# Patient Record
Sex: MALE | Race: ASIAN | NOT HISPANIC OR LATINO | URBAN - METROPOLITAN AREA
[De-identification: names, ages, dates, MRNs, and addresses within clinical notes are randomized per-mention and may not be internally consistent; named-entity substitution may affect disease eponyms.]

---

## 2024-05-23 NOTE — ASU PATIENT PROFILE, ADULT - NSICDXPASTMEDICALHX_GEN_ALL_CORE_FT
PAST MEDICAL HISTORY:  Hyperlipidemia      PAST MEDICAL HISTORY:  BPH (benign prostatic hyperplasia)     HTN (hypertension)     Hyperlipidemia

## 2024-05-23 NOTE — ASU PATIENT PROFILE, ADULT - NS PREOP UNDERSTANDS INFO
verify escort, states he doesn't need one. was told he does need one.  Left message for the surgeon. Assess DOS/yes verify escort, states he doesn't need one. was told he does need one.  Left message for the surgeon. Dr. Gibson Called back. Stated he will call patient , He stated the patient was advised preop that he needs and escort. Stated no escort, no surgery. [id number]. Assess DOS/yes

## 2024-05-24 ENCOUNTER — OUTPATIENT (OUTPATIENT)
Dept: OUTPATIENT SERVICES | Facility: HOSPITAL | Age: 74
LOS: 1 days | Discharge: HOME | End: 2024-05-24
Payer: MEDICARE

## 2024-05-24 VITALS
OXYGEN SATURATION: 100 % | RESPIRATION RATE: 16 BRPM | SYSTOLIC BLOOD PRESSURE: 158 MMHG | TEMPERATURE: 98 F | HEIGHT: 66.93 IN | WEIGHT: 136.69 LBS | DIASTOLIC BLOOD PRESSURE: 90 MMHG | HEART RATE: 62 BPM

## 2024-05-24 VITALS
RESPIRATION RATE: 14 BRPM | OXYGEN SATURATION: 100 % | SYSTOLIC BLOOD PRESSURE: 152 MMHG | HEART RATE: 69 BPM | DIASTOLIC BLOOD PRESSURE: 87 MMHG

## 2024-05-24 DIAGNOSIS — Z98.890 OTHER SPECIFIED POSTPROCEDURAL STATES: Chronic | ICD-10-CM

## 2024-05-24 PROCEDURE — 88173 CYTOPATH EVAL FNA REPORT: CPT | Mod: 26

## 2024-05-24 PROCEDURE — 88305 TISSUE EXAM BY PATHOLOGIST: CPT | Mod: 26

## 2024-05-24 RX ORDER — SODIUM CHLORIDE 9 MG/ML
1000 INJECTION, SOLUTION INTRAVENOUS
Refills: 0 | Status: DISCONTINUED | OUTPATIENT
Start: 2024-05-24 | End: 2024-05-24

## 2024-05-24 RX ORDER — ACETAMINOPHEN 500 MG
1000 TABLET ORAL ONCE
Refills: 0 | Status: COMPLETED | OUTPATIENT
Start: 2024-05-24 | End: 2024-05-24

## 2024-05-24 RX ORDER — APREPITANT 80 MG/1
40 CAPSULE ORAL ONCE
Refills: 0 | Status: COMPLETED | OUTPATIENT
Start: 2024-05-24 | End: 2024-05-24

## 2024-05-24 RX ORDER — FENTANYL CITRATE 50 UG/ML
25 INJECTION INTRAVENOUS
Refills: 0 | Status: DISCONTINUED | OUTPATIENT
Start: 2024-05-24 | End: 2024-05-24

## 2024-05-24 RX ORDER — CHLORHEXIDINE GLUCONATE 213 G/1000ML
1 SOLUTION TOPICAL ONCE
Refills: 0 | Status: COMPLETED | OUTPATIENT
Start: 2024-05-24 | End: 2024-05-24

## 2024-05-24 RX ADMIN — APREPITANT 40 MILLIGRAM(S): 80 CAPSULE ORAL at 13:33

## 2024-05-24 RX ADMIN — CHLORHEXIDINE GLUCONATE 1 APPLICATION(S): 213 SOLUTION TOPICAL at 13:36

## 2024-05-24 RX ADMIN — Medication 1000 MILLIGRAM(S): at 13:34

## 2024-05-24 NOTE — ASU DISCHARGE PLAN (ADULT/PEDIATRIC) - CARE PROVIDER_API CALL
Anthony Gibson  Surgery  [address] [place] [address], [address]  [place] [address]  Phone: [phone]  Fax: [phone]  Follow Up Time: 1 week

## 2024-05-24 NOTE — BRIEF OPERATIVE NOTE - OPERATION/FINDINGS
Left groin was prepped and draped in sterile fashion. ASIS and pubic tubercle were identified and marked. Local anesthetic was infiltrated along the inguinal ligament. Oblique linear incision was made medially over inguinal ligament. Dissection was performed down to external oblique aponeurosis. Aponeurosis was opened up along the muscle fibers and extended inferolaterally and superomedially down to external ring and up to internal ring, respectively. Ilioinguinal nerve was identified anterior to spermatic cord. Spermatic cord was bluntly dissected around and isolated using a Penrose drain. Cord was then dissected down to external ring but no hernia sac was identified or appreciated. A large left scrotal cyst was identified instead. Cyst was drained and sent for cytology. The external oblique aponeurosis was reapproximated with Vicryl running suture. Local anesthetic was used to infiltrate inguinal floor and subcutaneous issue again. Subcutaneous, deep dermal and skin closure was subsequently performed with Vicryl and Monocryl sutures. Incision was dressed with Steri strips, 4 x 4 cm gauze and Tegaderm. Patient was taken to recovery room in stable condition.

## 2024-05-24 NOTE — PRE-ANESTHESIA EVALUATION ADULT - NSANTHOSAYNRD_GEN_A_CORE
No. JOAQUINA screening performed.  STOP BANG Legend: 0-2 = LOW Risk; 3-4 = INTERMEDIATE Risk; 5-8 = HIGH Risk

## 2024-05-24 NOTE — ASU DISCHARGE PLAN (ADULT/PEDIATRIC) - ASU DC SPECIAL INSTRUCTIONSFT
- You may alternate between over-the-counter Tylenol 650 mg and Ibuprofen 600 mg every 3 hours for the next 48 hours. Afterwards take only Tylenol as needed. Do not take Ibuprofen continuously for more than 5 days and do not take on empty stomach.   - Take Opioid as prescribed only for severe or breakthrough pain. This medication may make you constipated so ensure to take a stool softener (Colace) with it.     Incision Care:   - Your incision was closed/dressed with steri-strips, 4 x 4 cm gauze and Tegaderm. The Tegaderm is waterproof so you may take a shower as early as the day of surgery, but avoid scrubbing your incisions and just allow water to run over them.    - Take off the Tegaderm and 4 x 4 cm gauze after 48 hours but leave the steri-strips on, they will fall off on their own or will be taken off at your first post-op visit.   - Do not submerge in water for at least 2 weeks. This includes pools, bath tubs and hot tubs.     Activity/Excercise:   - Avoid strenuous exercise for the first week after Surgery, and avoid heavy lifting (> 15 lbs) for at least 6 weeks.   - You may begin light aerobic exercises as tolerated or preferred after the first few days post-op as long as your pain is well controlled   - Avoid driving in the first 24 hours post-op and while taking Narcotic/Opioid pain medications     Diet:   - Continue your regular diet; no restrictions   - Increase your fluid intake for the first week post-op and while taking Narcotic/Opioid pain medications     Follow up:   - Please call your Surgeon's office ASAP at [phone] to schedule or confirm your follow up appointment which should be 7 to 10 days after your surgery Pain Control:   - You may alternate between over-the-counter Tylenol 650 mg and Ibuprofen 600 mg every 3 hours for the next 48 hours. Afterwards take only Tylenol as needed. Do not take Ibuprofen continuously for more than 5 days and do not take on empty stomach.   - Take Opioid as prescribed only for severe or breakthrough pain. This medication may make you constipated so ensure to take a stool softener (Colace) with it.     Incision Care:   - Your incision was closed/dressed with steri-strips, 4 x 4 cm gauze and Tegaderm. The Tegaderm is waterproof so you may take a shower as early as the day of surgery, but avoid scrubbing your incisions and just allow water to run over them.    - Take off the Tegaderm and 4 x 4 cm gauze after 48 hours but leave the steri-strips on, they will fall off on their own or will be taken off at your first post-op visit.   - Do not submerge in water for at least 2 weeks. This includes pools, bath tubs and hot tubs.     Activity/Excercise:   - Avoid strenuous exercise for the first week after Surgery, and avoid heavy lifting (> 15 lbs) for at least 6 weeks.   - You may begin light aerobic exercises as tolerated or preferred after the first few days post-op as long as your pain is well controlled   - Avoid driving in the first 24 hours post-op and while taking Narcotic/Opioid pain medications     Diet:   - Continue your regular diet; no restrictions   - Increase your fluid intake for the first week post-op and while taking Narcotic/Opioid pain medications     Follow up:   - Please call your Surgeon's office ASAP at [phone] to schedule or confirm your follow up appointment which should be 7 to 10 days after your surgery

## 2024-05-24 NOTE — ASU DISCHARGE PLAN (ADULT/PEDIATRIC) - COMMENTS
Please call your Surgeon's office ASAP at [phone] to schedule or confirm your follow up appointment which should be 7 to 10 days after your surgery

## 2024-05-30 LAB — NON-GYNECOLOGICAL CYTOLOGY STUDY: SIGNIFICANT CHANGE UP

## 2024-08-26 NOTE — PRE-ANESTHESIA EVALUATION ADULT - BP NONINVASIVE SYSTOLIC (MM HG)
Faxed request for auth to Kaitlyn at [facility] for abdominal US on 9/16/24 and office appt with Dr Delgado on 9/24/24  
[phone]

## 2025-07-07 NOTE — ASU PATIENT PROFILE, ADULT - FALL HARM RISK - UNIVERSAL INTERVENTIONS
spouse Bed in lowest position, wheels locked, appropriate side rails in place/Call bell, personal items and telephone in reach/Instruct patient to call for assistance before getting out of bed or chair/Non-slip footwear when patient is out of bed/[place] to call system/Physically safe environment - no spills, clutter or unnecessary equipment/Purposeful Proactive Rounding/Room/bathroom lighting operational, light cord in reach

## (undated) DEVICE — GLV 7 PROTEXIS (WHITE)

## (undated) DEVICE — VENODYNE/SCD SLEEVE CALF MEDIUM

## (undated) DEVICE — ELCTR STRYKER NEPTUNE BLADE COATED, INSULATED 70MM

## (undated) DEVICE — PACK GENERAL MINOR

## (undated) DEVICE — PREP CHLORAPREP HI-LITE ORANGE 26ML

## (undated) DEVICE — SUT VICRYL PLUS 2-0 27" SH UNDYED

## (undated) DEVICE — DRSG MASTISOL

## (undated) DEVICE — DRAIN PENROSE .25" X 18" LATEX

## (undated) DEVICE — SUT VICRYL PLUS 3-0 27" SH UNDYED

## (undated) DEVICE — ELCTR PENCIL SMOKE EVACUATOR COATED PUSH BUTTON 70MM

## (undated) DEVICE — DRSG STERISTRIPS 0.5 X 4"

## (undated) DEVICE — DRSG GAUZE SPONGE 2X2" STERILE

## (undated) DEVICE — GLV 6.5 PROTEXIS (WHITE)

## (undated) DEVICE — DRSG TEGADERM 4X4.75"

## (undated) DEVICE — POSITIONER FOAM EGG CRATE ULNAR 2PCS (PINK)